# Patient Record
Sex: FEMALE
[De-identification: names, ages, dates, MRNs, and addresses within clinical notes are randomized per-mention and may not be internally consistent; named-entity substitution may affect disease eponyms.]

---

## 2018-09-17 ENCOUNTER — FORM ENCOUNTER (OUTPATIENT)
Age: 58
End: 2018-09-17

## 2018-09-17 PROBLEM — Z00.00 ENCOUNTER FOR PREVENTIVE HEALTH EXAMINATION: Status: ACTIVE | Noted: 2018-09-17

## 2018-09-18 ENCOUNTER — APPOINTMENT (OUTPATIENT)
Dept: RADIOLOGY | Facility: CLINIC | Age: 58
End: 2018-09-18

## 2018-09-18 ENCOUNTER — APPOINTMENT (OUTPATIENT)
Dept: ORTHOPEDIC SURGERY | Facility: CLINIC | Age: 58
End: 2018-09-18
Payer: COMMERCIAL

## 2018-09-18 ENCOUNTER — OUTPATIENT (OUTPATIENT)
Dept: OUTPATIENT SERVICES | Facility: HOSPITAL | Age: 58
LOS: 1 days | End: 2018-09-18
Payer: COMMERCIAL

## 2018-09-18 VITALS — BODY MASS INDEX: 20.65 KG/M2 | HEIGHT: 63.5 IN | WEIGHT: 118 LBS

## 2018-09-18 DIAGNOSIS — Z82.61 FAMILY HISTORY OF ARTHRITIS: ICD-10-CM

## 2018-09-18 DIAGNOSIS — Z87.39 PERSONAL HISTORY OF OTHER DISEASES OF THE MUSCULOSKELETAL SYSTEM AND CONNECTIVE TISSUE: ICD-10-CM

## 2018-09-18 DIAGNOSIS — S63.502A UNSPECIFIED SPRAIN OF LEFT WRIST, INITIAL ENCOUNTER: ICD-10-CM

## 2018-09-18 DIAGNOSIS — Z78.9 OTHER SPECIFIED HEALTH STATUS: ICD-10-CM

## 2018-09-18 PROCEDURE — 73080 X-RAY EXAM OF ELBOW: CPT

## 2018-09-18 PROCEDURE — 73080 X-RAY EXAM OF ELBOW: CPT | Mod: 26,LT

## 2018-09-18 PROCEDURE — 99204 OFFICE O/P NEW MOD 45 MIN: CPT

## 2018-09-18 RX ORDER — PERPHENAZINE 2 MG
TABLET ORAL
Refills: 0 | Status: ACTIVE | COMMUNITY

## 2018-09-19 ENCOUNTER — FORM ENCOUNTER (OUTPATIENT)
Age: 58
End: 2018-09-19

## 2018-09-19 ENCOUNTER — TRANSCRIPTION ENCOUNTER (OUTPATIENT)
Age: 58
End: 2018-09-19

## 2018-09-20 ENCOUNTER — APPOINTMENT (OUTPATIENT)
Dept: CT IMAGING | Facility: CLINIC | Age: 58
End: 2018-09-20
Payer: COMMERCIAL

## 2018-09-20 ENCOUNTER — OUTPATIENT (OUTPATIENT)
Dept: OUTPATIENT SERVICES | Facility: HOSPITAL | Age: 58
LOS: 1 days | End: 2018-09-20

## 2018-09-20 PROBLEM — S63.502A SPRAIN OF LEFT WRIST, INITIAL ENCOUNTER: Status: ACTIVE | Noted: 2018-09-20

## 2018-09-20 PROCEDURE — 73200 CT UPPER EXTREMITY W/O DYE: CPT | Mod: 26,LT

## 2018-09-26 ENCOUNTER — APPOINTMENT (OUTPATIENT)
Age: 58
End: 2018-09-26
Payer: COMMERCIAL

## 2018-09-26 PROCEDURE — 24130 EXCISION RADIAL HEAD: CPT | Mod: LT

## 2018-09-28 ENCOUNTER — APPOINTMENT (OUTPATIENT)
Dept: ORTHOPEDIC SURGERY | Facility: CLINIC | Age: 58
End: 2018-09-28
Payer: COMMERCIAL

## 2018-09-28 PROCEDURE — 99024 POSTOP FOLLOW-UP VISIT: CPT

## 2018-10-05 ENCOUNTER — APPOINTMENT (OUTPATIENT)
Dept: ORTHOPEDIC SURGERY | Facility: CLINIC | Age: 58
End: 2018-10-05
Payer: COMMERCIAL

## 2018-10-05 VITALS — HEIGHT: 63.5 IN | BODY MASS INDEX: 20.65 KG/M2 | WEIGHT: 118 LBS

## 2018-10-05 PROCEDURE — 99024 POSTOP FOLLOW-UP VISIT: CPT

## 2018-10-05 RX ORDER — OXYCODONE AND ACETAMINOPHEN 5; 325 MG/1; MG/1
5-325 TABLET ORAL EVERY 4 HOURS
Qty: 10 | Refills: 0 | Status: DISCONTINUED | COMMUNITY
Start: 2018-09-25 | End: 2018-10-05

## 2019-03-12 ENCOUNTER — APPOINTMENT (OUTPATIENT)
Dept: ORTHOPEDIC SURGERY | Facility: CLINIC | Age: 59
End: 2019-03-12
Payer: COMMERCIAL

## 2019-03-12 PROCEDURE — 99212 OFFICE O/P EST SF 10 MIN: CPT

## 2019-03-13 NOTE — HISTORY OF PRESENT ILLNESS
[de-identified] : Ms. Powers visits us today 5.5 months following left radial head excision on 9-26-18. She states that she has regained full active and passive range of motion of the left elbow and has no limitation of activities. She states that she has occasional soreness in the left elbow with more vigorous exercises. 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

## 2019-03-13 NOTE — PHYSICAL EXAM
[de-identified] : The left elbow and lateral incision is well-healed range of motion is 0-135° flexion and extension with a full arc of pronation and supination. She has normal strength in the elbow wrist and hand and no pain with power  of the left hand.

## 2019-03-13 NOTE — DISCUSSION/SUMMARY
[Medication Risks Reviewed] : Medication risks reviewed [Surgical risks reviewed] : Surgical risks reviewed [de-identified] : I explained to Stella that she has excellent function nearly 6 months following left radial head excision and her elbow exam today is normal. I advised her to continue a strengthening program for the left upper extremity and this will resolve any residual pain she has with more vigorous activities.\par \par She should return from a repeat evaluation at anytime in the future if she develops pain in the elbow that precludes her ability to use the left arm normally.

## 2019-03-13 NOTE — CONSULT LETTER
[Dear  ___] : Dear  [unfilled], [FreeTextEntry1] : Today I had the pleasure of evaluating your very nice patient CHATA RAMESH  who requested that I share my findings with you. I very much appreciate the referral. \par \par Please review my office note below and, needless to say, please call or email me with any questions or concerns.\par \par I appreciate the opportunity to participate in her care.\par \par Sincerely,\par \par Bebo Vela MD\par Director, Orthopaedic Surgery\par and Orthopaedic [employer] \par [facility]\par Office: [phone]\par Cell: [phone]\par Email: [email or web address]\par Website: [email or web address] \par \par \par \par \par

## 2019-04-19 ENCOUNTER — TRANSCRIPTION ENCOUNTER (OUTPATIENT)
Age: 59
End: 2019-04-19

## 2020-12-04 ENCOUNTER — APPOINTMENT (OUTPATIENT)
Dept: ORTHOPEDIC SURGERY | Facility: CLINIC | Age: 60
End: 2020-12-04
Payer: COMMERCIAL

## 2020-12-04 ENCOUNTER — RESULT REVIEW (OUTPATIENT)
Age: 60
End: 2020-12-04

## 2020-12-04 ENCOUNTER — OUTPATIENT (OUTPATIENT)
Dept: OUTPATIENT SERVICES | Facility: HOSPITAL | Age: 60
LOS: 1 days | End: 2020-12-04
Payer: COMMERCIAL

## 2020-12-04 PROCEDURE — 99072 ADDL SUPL MATRL&STAF TM PHE: CPT

## 2020-12-04 PROCEDURE — 99214 OFFICE O/P EST MOD 30 MIN: CPT

## 2020-12-04 PROCEDURE — 73080 X-RAY EXAM OF ELBOW: CPT | Mod: 26,LT

## 2020-12-04 PROCEDURE — 73080 X-RAY EXAM OF ELBOW: CPT

## 2021-01-15 ENCOUNTER — APPOINTMENT (OUTPATIENT)
Dept: ORTHOPEDIC SURGERY | Facility: CLINIC | Age: 61
End: 2021-01-15
Payer: COMMERCIAL

## 2021-01-15 VITALS — HEIGHT: 63.5 IN | WEIGHT: 122 LBS | BODY MASS INDEX: 21.35 KG/M2

## 2021-01-15 PROCEDURE — 99212 OFFICE O/P EST SF 10 MIN: CPT

## 2021-01-15 PROCEDURE — 99072 ADDL SUPL MATRL&STAF TM PHE: CPT

## 2021-01-27 ENCOUNTER — OUTPATIENT (OUTPATIENT)
Dept: OUTPATIENT SERVICES | Facility: HOSPITAL | Age: 61
LOS: 1 days | End: 2021-01-27

## 2021-01-27 ENCOUNTER — APPOINTMENT (OUTPATIENT)
Dept: MRI IMAGING | Facility: CLINIC | Age: 61
End: 2021-01-27
Payer: COMMERCIAL

## 2021-01-27 ENCOUNTER — RESULT REVIEW (OUTPATIENT)
Age: 61
End: 2021-01-27

## 2021-01-27 PROCEDURE — 73221 MRI JOINT UPR EXTREM W/O DYE: CPT | Mod: 26,LT

## 2021-02-19 ENCOUNTER — APPOINTMENT (OUTPATIENT)
Dept: ORTHOPEDIC SURGERY | Facility: CLINIC | Age: 61
End: 2021-02-19
Payer: COMMERCIAL

## 2021-02-19 VITALS — WEIGHT: 122 LBS | BODY MASS INDEX: 21.35 KG/M2 | HEIGHT: 63.5 IN

## 2021-02-19 PROCEDURE — 99212 OFFICE O/P EST SF 10 MIN: CPT

## 2021-02-19 PROCEDURE — 99072 ADDL SUPL MATRL&STAF TM PHE: CPT

## 2021-04-20 ENCOUNTER — APPOINTMENT (OUTPATIENT)
Dept: ORTHOPEDIC SURGERY | Facility: CLINIC | Age: 61
End: 2021-04-20
Payer: COMMERCIAL

## 2021-04-20 PROCEDURE — 99072 ADDL SUPL MATRL&STAF TM PHE: CPT

## 2021-04-20 PROCEDURE — 99212 OFFICE O/P EST SF 10 MIN: CPT

## 2021-07-09 ENCOUNTER — APPOINTMENT (OUTPATIENT)
Dept: ORTHOPEDIC SURGERY | Facility: CLINIC | Age: 61
End: 2021-07-09
Payer: COMMERCIAL

## 2021-07-09 VITALS — BODY MASS INDEX: 20.82 KG/M2 | HEIGHT: 63.5 IN | WEIGHT: 119 LBS

## 2021-07-09 PROCEDURE — 99212 OFFICE O/P EST SF 10 MIN: CPT

## 2021-07-09 PROCEDURE — 99072 ADDL SUPL MATRL&STAF TM PHE: CPT

## 2022-09-16 DIAGNOSIS — S52.122D DISPLACED FRACTURE OF HEAD OF LEFT RADIUS, SUBSEQUENT ENCOUNTER FOR CLOSED FRACTURE WITH ROUTINE HEALING: ICD-10-CM

## 2022-09-16 DIAGNOSIS — M19.022 PRIMARY OSTEOARTHRITIS, LEFT ELBOW: ICD-10-CM

## 2022-09-16 DIAGNOSIS — S52.042A DISPLACED FRACTURE OF CORONOID PROCESS OF LEFT ULNA, INITIAL ENCOUNTER FOR CLOSED FRACTURE: ICD-10-CM

## 2022-10-04 ENCOUNTER — APPOINTMENT (OUTPATIENT)
Dept: ORTHOPEDIC SURGERY | Facility: CLINIC | Age: 62
End: 2022-10-04